# Patient Record
Sex: FEMALE | Race: OTHER | HISPANIC OR LATINO
[De-identification: names, ages, dates, MRNs, and addresses within clinical notes are randomized per-mention and may not be internally consistent; named-entity substitution may affect disease eponyms.]

---

## 2017-03-30 ENCOUNTER — APPOINTMENT (OUTPATIENT)
Dept: BARIATRICS | Facility: CLINIC | Age: 42
End: 2017-03-30

## 2023-02-01 ENCOUNTER — APPOINTMENT (OUTPATIENT)
Dept: ORTHOPEDIC SURGERY | Facility: CLINIC | Age: 48
End: 2023-02-01

## 2023-02-03 ENCOUNTER — APPOINTMENT (OUTPATIENT)
Dept: ORTHOPEDIC SURGERY | Facility: CLINIC | Age: 48
End: 2023-02-03
Payer: COMMERCIAL

## 2023-02-03 VITALS — BODY MASS INDEX: 38.25 KG/M2 | HEIGHT: 66 IN | WEIGHT: 238 LBS

## 2023-02-03 DIAGNOSIS — E66.01 MORBID (SEVERE) OBESITY DUE TO EXCESS CALORIES: ICD-10-CM

## 2023-02-03 DIAGNOSIS — M17.12 UNILATERAL PRIMARY OSTEOARTHRITIS, LEFT KNEE: ICD-10-CM

## 2023-02-03 DIAGNOSIS — M17.11 UNILATERAL PRIMARY OSTEOARTHRITIS, RIGHT KNEE: ICD-10-CM

## 2023-02-03 DIAGNOSIS — M25.569 PAIN IN UNSPECIFIED KNEE: ICD-10-CM

## 2023-02-03 PROCEDURE — 20611 DRAIN/INJ JOINT/BURSA W/US: CPT | Mod: 50

## 2023-02-03 PROCEDURE — J3490M: CUSTOM

## 2023-02-03 PROCEDURE — 73564 X-RAY EXAM KNEE 4 OR MORE: CPT | Mod: 50

## 2023-02-03 PROCEDURE — 99203 OFFICE O/P NEW LOW 30 MIN: CPT | Mod: 25

## 2023-02-03 NOTE — IMAGING
[de-identified] : Bilat Varus deformity\par Mild effusion, no warmth, no ecchymosis\par Medial joint line tenderness to palpation \par Range of motion 0-110 with associated crepitus\par 5/5 quadriceps and hamstring strength\par Ligamentously stable\par Motor and sensory intact distally\par Mildly antalgic gait\par Negative Fox\par  [Bilateral] : knee bilaterally [All Views] : anteroposterior, lateral, skyline, and anteroposterior standing [Moderate tricompartmental OA medial narrowing] : Moderate tricompartmental OA medial narrowing

## 2023-02-03 NOTE — HISTORY OF PRESENT ILLNESS
[10] : 10 [5] : 5 [Sharp] : sharp [Tightness] : tightness [Constant] : constant [Rest] : rest [Walking] : walking [Full time] : Work status: full time [de-identified] : 02/03/2023: PT PRESENTS TODAY FOR A NEW INJURY VISIT FOR B/L KNEES. STATES SHE HAS BEEN HAVING PAIN FOR 4 YEARS. DOES NOT RECALL ANY SPECIFIC INJURY. STATES SHE FEELING LIKE HER RIGHT KNEE LOCKS.  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

## 2023-02-03 NOTE — PROCEDURE
[FreeTextEntry3] : - Large joint injection was performed of the BILAT knee. \par - The indication for this procedure was pain and inflammation. Patient has tried OTC's including aspirin, Ibuprofen, Aleve, etc or prescription NSAIDS, and/or exercises at home and/or physical therapy without satisfactory response, patient had decreased mobility in the joint and the risks benefits, and alternatives have been discussed, and verbal consent was obtained. The site was prepped with alcohol, betadine, ethyl chloride sprayed topically and sterile technique used. \par - An injection of Betamethasone 2cc; Marcaine 5cc injected.\par - Patient was advised to call if redness, pain or fever occur, apply ice for 15 minutes out of every hour for the next 12-24 hours as tolerated and patient was advised to rest the joint(s) for 2 days. \par - Ultrasound guidance was indicated for this patient due to MORBID OBESITY. All ultrasound images have been permanently captured and stored accordingly in our picture archiving and communication system. Visualization of the needle and placement of injection was performed without complication.\par

## 2023-02-03 NOTE — ASSESSMENT
[FreeTextEntry1] : MOD GLOBAL OA, WORSE PF JOINT\par RECENT LARGE WEIGHT GAIN DURING COVID\par LONG DISCUSSION ABOUT NEED TO EXERCISE, DIET\par PRIOR RELIEF WITH CSI\par REPEAT TODAY\par SET UP EUFLEXXA FOR NEXT TIME IN 2-3 WEEKS\par \par -The patient's diagnosis was reviewed in detail. Explained this is a chronic, progressive deteriorating condition that may need treatment from time to time as the flare-ups occur. \par -The natural progression of Osteoarthritis was explained to the patient. We discussed the possible treatment options from conservative to operative. \par -We discussed prescription strength NSAIDs, Glucosamine and Chondroitin sulfate, and Physical Therapy as well different types of injections including viscosupplementation. \par -We also discussed that at some point they may progress to needed a total knee replacement. \par \par